# Patient Record
Sex: FEMALE | URBAN - METROPOLITAN AREA
[De-identification: names, ages, dates, MRNs, and addresses within clinical notes are randomized per-mention and may not be internally consistent; named-entity substitution may affect disease eponyms.]

---

## 2020-01-18 ENCOUNTER — OFFICE VISIT (OUTPATIENT)
Dept: URBAN - METROPOLITAN AREA CLINIC 22 | Facility: CLINIC | Age: 20
End: 2020-01-18
Payer: COMMERCIAL

## 2020-01-18 DIAGNOSIS — H52.13 MYOPIA, BILATERAL: Primary | ICD-10-CM

## 2020-01-18 PROCEDURE — 92310 CONTACT LENS FITTING OU: CPT | Performed by: OPTOMETRIST

## 2020-01-18 PROCEDURE — 92015 DETERMINE REFRACTIVE STATE: CPT | Performed by: OPTOMETRIST

## 2020-01-18 PROCEDURE — 92012 INTRM OPH EXAM EST PATIENT: CPT | Performed by: OPTOMETRIST

## 2020-01-18 ASSESSMENT — KERATOMETRY
OD: 47.19
OS: 46.56

## 2020-01-18 ASSESSMENT — INTRAOCULAR PRESSURE
OS: 15
OD: 16

## 2020-01-18 NOTE — IMPRESSION/PLAN
Impression: Myopia, bilateral: H52.13. OU. Plan: Dispense new glasses Rx, discussed changes with patient. Patient was also given daly because of how near sited they are they have a higher chance of retinal hole and tears and that its a good idea to be dilated on an annual basis.